# Patient Record
Sex: FEMALE | Race: WHITE | Employment: PART TIME | URBAN - METROPOLITAN AREA
[De-identification: names, ages, dates, MRNs, and addresses within clinical notes are randomized per-mention and may not be internally consistent; named-entity substitution may affect disease eponyms.]

---

## 2023-11-22 ENCOUNTER — HOSPITAL ENCOUNTER (EMERGENCY)
Facility: HOSPITAL | Age: 18
Discharge: HOME | End: 2023-11-23
Attending: EMERGENCY MEDICINE
Payer: COMMERCIAL

## 2023-11-22 DIAGNOSIS — R04.0 EPISTAXIS: Primary | ICD-10-CM

## 2023-11-22 PROCEDURE — 30903 CONTROL OF NOSEBLEED: CPT

## 2023-11-22 PROCEDURE — 99283 EMERGENCY DEPT VISIT LOW MDM: CPT

## 2023-11-22 PROCEDURE — 6370000000 HC RX 637 (ALT 250 FOR IP): Performed by: EMERGENCY MEDICINE

## 2023-11-22 RX ORDER — ONDANSETRON 4 MG/1
4 TABLET, ORALLY DISINTEGRATING ORAL ONCE
Status: COMPLETED | OUTPATIENT
Start: 2023-11-22 | End: 2023-11-22

## 2023-11-22 RX ORDER — OXYMETAZOLINE HYDROCHLORIDE 0.05 G/100ML
3 SPRAY NASAL
Status: COMPLETED | OUTPATIENT
Start: 2023-11-22 | End: 2023-11-23

## 2023-11-22 RX ADMIN — ONDANSETRON 4 MG: 4 TABLET, ORALLY DISINTEGRATING ORAL at 22:53

## 2023-11-23 VITALS
DIASTOLIC BLOOD PRESSURE: 60 MMHG | RESPIRATION RATE: 13 BRPM | HEIGHT: 66 IN | BODY MASS INDEX: 28.42 KG/M2 | TEMPERATURE: 98.7 F | WEIGHT: 176.81 LBS | OXYGEN SATURATION: 98 % | HEART RATE: 66 BPM | SYSTOLIC BLOOD PRESSURE: 100 MMHG

## 2023-11-23 PROCEDURE — 6370000000 HC RX 637 (ALT 250 FOR IP): Performed by: EMERGENCY MEDICINE

## 2023-11-23 RX ADMIN — NASAL DECONGESTANT 3 SPRAY: 0.05 SPRAY NASAL at 00:04

## 2023-11-23 RX ADMIN — Medication 1 EACH: at 00:04

## 2023-11-23 NOTE — DISCHARGE INSTRUCTIONS
It was a pleasure taking care of you in our Emergency Department today. We know that when you come to [facility], you are entrusting us with your health, comfort, and safety. Our physicians and nurses honor that trust, and truly appreciate the opportunity to care for you and your loved ones. We also value your feedback. If you receive a survey about your Emergency Department experience today, please fill it out. We care about our patients' feedback, and we listen to what you have to say. Thank you!       --- Dr. Irma Redmond MD    Home Nosebleed Instructions:  1)  Apply nasal saline spray, x2 sprays in each nostril, every 2-3 hours and as needed. 2)  Apply afrin nasal spray, [id number] in each nostril, twice a day for 2-3 days (NO MORE THAN 3 DAYS). 3) Vaseline or bacitracin dab to both nostrils twice daily and as needed  4) Avoid nose blowing/wiping/digital manipulation, avoid prolonged hot showers, avoid strenuous activity/straining for the next 48 hours. 5) sneeze with mouth open  6) If nosebleed recurs, first apply pressure to soft part of nose for at least 20 minutes, gargle ice water, administer Afrin, repeat.

## 2023-11-23 NOTE — ED NOTES
Brought in by EMS. Patient is fully conscious and alert, no active bleeding from nose upon arrival to ED. Noted with dried blood on right nostril. Patient vital signs taken. Call bell instructed to patient.      Sergio Sylvester RN  11/22/23 [id number]

## 2023-11-23 NOTE — ED NOTES
Discussed and reviewed discharge instructions with patient by Dr. Lu Storm. Provided opportunity for questions, patient expressed understanding. Reviewed reasons to return to the Emergency Department. Patient stable at time of discharge. Went out to ER ambulatory.      Sherry Caldwell RN  11/23/23 [phone]

## 2023-12-05 ASSESSMENT — ENCOUNTER SYMPTOMS
TROUBLE SWALLOWING: 0
VOICE CHANGE: 0
ABDOMINAL PAIN: 0
COUGH: 0
SHORTNESS OF BREATH: 0